# Patient Record
Sex: FEMALE | Race: WHITE | NOT HISPANIC OR LATINO
[De-identification: names, ages, dates, MRNs, and addresses within clinical notes are randomized per-mention and may not be internally consistent; named-entity substitution may affect disease eponyms.]

---

## 2019-06-23 ENCOUNTER — TRANSCRIPTION ENCOUNTER (OUTPATIENT)
Age: 49
End: 2019-06-23

## 2019-12-01 ENCOUNTER — TRANSCRIPTION ENCOUNTER (OUTPATIENT)
Age: 49
End: 2019-12-01

## 2020-06-09 ENCOUNTER — TRANSCRIPTION ENCOUNTER (OUTPATIENT)
Age: 50
End: 2020-06-09

## 2021-05-11 ENCOUNTER — TRANSCRIPTION ENCOUNTER (OUTPATIENT)
Age: 51
End: 2021-05-11

## 2022-06-16 PROBLEM — Z00.00 ENCOUNTER FOR PREVENTIVE HEALTH EXAMINATION: Status: ACTIVE | Noted: 2022-06-16

## 2022-06-17 ENCOUNTER — APPOINTMENT (OUTPATIENT)
Dept: OTOLARYNGOLOGY | Facility: CLINIC | Age: 52
End: 2022-06-17
Payer: COMMERCIAL

## 2022-06-17 VITALS — TEMPERATURE: 97.8 F

## 2022-06-17 DIAGNOSIS — K21.9 GASTRO-ESOPHAGEAL REFLUX DISEASE W/OUT ESOPHAGITIS: ICD-10-CM

## 2022-06-17 DIAGNOSIS — Z78.9 OTHER SPECIFIED HEALTH STATUS: ICD-10-CM

## 2022-06-17 DIAGNOSIS — J35.8 OTHER CHRONIC DISEASES OF TONSILS AND ADENOIDS: ICD-10-CM

## 2022-06-17 DIAGNOSIS — J31.0 CHRONIC RHINITIS: ICD-10-CM

## 2022-06-17 DIAGNOSIS — Z80.9 FAMILY HISTORY OF MALIGNANT NEOPLASM, UNSPECIFIED: ICD-10-CM

## 2022-06-17 DIAGNOSIS — R49.0 DYSPHONIA: ICD-10-CM

## 2022-06-17 DIAGNOSIS — Z87.891 PERSONAL HISTORY OF NICOTINE DEPENDENCE: ICD-10-CM

## 2022-06-17 PROCEDURE — 99203 OFFICE O/P NEW LOW 30 MIN: CPT | Mod: 25

## 2022-06-17 PROCEDURE — 31575 DIAGNOSTIC LARYNGOSCOPY: CPT

## 2022-06-17 NOTE — HISTORY OF PRESENT ILLNESS
[de-identified] : ORIANA MANZANARES is a 51 year old patient here for a scratchy voice and rattle in her throat.  She has had symptoms intermittently for the past 2 years.  She describes a postnasal drip with throat clearing, scratchy throat, and occasional dysphonia.  She has no throat pain or difficulty swallowing.  It is better when she rests her voice.  She does have a high vocal load.  She talks a lot for work.  She denies a known history of reflux.  She does have a history of allergies.  She takes Zyrtec-D which helps.  She has not been using nasal saline irrigations or nasal steroid spray.  She does smoke e-cigarettes\par \par She has a history of tonsil stones for the past 5 to 7 years.  She said that she would get them on the left side.  She remove them about once a week herself.  She tried a WaterPik but it made her gag.  About 6 months ago, the tonsil stone stopped

## 2022-06-17 NOTE — CONSULT LETTER
[Dear  ___] : Dear ~ERIN, [Consult Letter:] : I had the pleasure of evaluating your patient, [unfilled]. [Please see my note below.] : Please see my note below. [Consult Closing:] : Thank you very much for allowing me to participate in the care of this patient.  If you have any questions, please do not hesitate to contact me. [Sincerely,] : Sincerely, [FreeTextEntry3] : Gabriela Lui MD\par

## 2022-06-17 NOTE — ASSESSMENT
[FreeTextEntry1] : She has a 2-year history of intermittent throat symptoms.  She has a history of allergies.  She likely also has reflux.  She had reflux related laryngeal changes on flexible laryngoscopy.  She also has vocal strain.  She does feel better when she rests her voice.  There were no vocal cord lesions.\par \par She has a long history of tonsil stones.  She has not had any for 6 months.\par \par Plan\par -Findings and management options were discussed with the patient.\par - Hydration, humidification, voice rest recommended. The patient was asked to avoid yelling, whispering, singing, throat clearing, and coughing\par - avoid alcohol based mouthwashes. Saltwater gargles prn\par - Reflux precautions and elevation of the head of bed at night\par - I recommended a trial of OTC medication for reflux.  Consider GI evaluation and/or pH probe testing\par -I asked her to avoid smoking e-cigarettes as they may cause laryngeal irritation\par - Allergy precautions and allergy medication as needed \par -Allergy evaluation\par - speech therapy evaluation recommended\par -Follow-up in 3 to 4 weeks or after the speech therapy evaluation\par -Call or return earlier if any worsening symptoms or concerns